# Patient Record
Sex: FEMALE | Race: WHITE | NOT HISPANIC OR LATINO | Employment: OTHER | URBAN - METROPOLITAN AREA
[De-identification: names, ages, dates, MRNs, and addresses within clinical notes are randomized per-mention and may not be internally consistent; named-entity substitution may affect disease eponyms.]

---

## 2017-05-25 ENCOUNTER — ALLSCRIPTS OFFICE VISIT (OUTPATIENT)
Dept: OTHER | Facility: OTHER | Age: 82
End: 2017-05-25

## 2017-09-12 DIAGNOSIS — F32.9 MAJOR DEPRESSIVE DISORDER, SINGLE EPISODE: ICD-10-CM

## 2017-09-12 DIAGNOSIS — G30.1 ALZHEIMER'S DISEASE WITH LATE ONSET (CODE) (HCC): ICD-10-CM

## 2017-09-14 ENCOUNTER — LAB CONVERSION - ENCOUNTER (OUTPATIENT)
Dept: OTHER | Facility: OTHER | Age: 82
End: 2017-09-14

## 2017-09-14 LAB — VALPROIC ACID TOTAL (HISTORICAL): 37.3 MG/L (ref 50–100)

## 2017-09-18 ENCOUNTER — GENERIC CONVERSION - ENCOUNTER (OUTPATIENT)
Dept: OTHER | Facility: OTHER | Age: 82
End: 2017-09-18

## 2017-11-08 ENCOUNTER — ALLSCRIPTS OFFICE VISIT (OUTPATIENT)
Dept: OTHER | Facility: OTHER | Age: 82
End: 2017-11-08

## 2017-11-08 ENCOUNTER — TRANSCRIBE ORDERS (OUTPATIENT)
Dept: RADIOLOGY | Facility: CLINIC | Age: 82
End: 2017-11-08

## 2017-11-08 DIAGNOSIS — R09.89 OTHER SPECIFIED SYMPTOMS AND SIGNS INVOLVING THE CIRCULATORY AND RESPIRATORY SYSTEMS: ICD-10-CM

## 2017-11-09 ENCOUNTER — HOSPITAL ENCOUNTER (OUTPATIENT)
Dept: ULTRASOUND IMAGING | Facility: CLINIC | Age: 82
Discharge: HOME | End: 2017-11-09
Payer: MEDICARE

## 2017-11-09 DIAGNOSIS — R09.89 OTHER SPECIFIED SYMPTOMS AND SIGNS INVOLVING THE CIRCULATORY AND RESPIRATORY SYSTEMS: ICD-10-CM

## 2017-11-09 PROCEDURE — 93880 EXTRACRANIAL BILAT STUDY: CPT

## 2017-11-10 NOTE — PROGRESS NOTES
Assessment  1  SDAT (senile dementia of Alzheimer's type) (331 0,294 10) (G30 1,F02 80)   2  Bruit of right carotid artery (785 9) (R09 89)   3  Depression (311) (F32 9)    Plan   Bruit of right carotid artery    · Continue with our present treatment plan ; Status:Complete;   Done: [id number]   Ordered;of right carotid artery; Ordered By:Bobby Hatch;  SDAT (senile dementia of Alzheimer's type)    · Divalproex Sodium 250 MG Oral Tablet Delayed Release (Depakote); TAKE 1TABLET TWICE DAILY   Rx By: Henna Villalobos; Dispense: 90 Days ; #:180 Tablet Delayed Release; Refill: 3;For: SDAT (senile dementia of Alzheimer's type); RANDY = N; Verified Transmission to [facility]; Last Updated By: System, SureScript[employer]; 11/8/2017 2:33:28 PM   · Donepezil HCl - 10 MG Oral Tablet (Aricept); take 1 tablet by mouth once daily   Rx By: Henna Villalobos; Dispense: 90 Days ; #:90 Tablet; Refill: 3;SDAT (senile dementia of Alzheimer's type); RANDY = N; Verified Transmission to [facility]; Last Updated By: System, SureScripts; 11/8/2017 2:33:28 PM  VAS CAROTID COMPLETE STUDY; SIDE:Bilateral; Status:Hold For - Scheduling; Requested [facility]:[id number];  Perform:[facility]; [facility]:[id number]; Ordered; For:Bruit of right carotid artery; Ordered By:Bobby Hatch; Follow-up visit in 3 months Evaluation and Treatment  Follow-up  Status: Hold For - Scheduling  Requested for: [id number] Ordered; For: Bruit of right carotid artery;  Ordered By: Henna Villalobos  Performed:   Due: 15LLV2917   Discussion/Summary  Discussion Summary:   Patient with a history of SDAT, with sundowning syndrome is advised to continue present medications  Mental stimulating exercises were again discussed and the patient will also undergo a carotid ultrasound in the near future, and remains on anticoagulation for atrial fibrillation  She will return back to see me in 3-4 months        Chief Complaint  Chief Complaint Free Text Note Form: Patient present for follow up appointment for SDAT and depression  History of Present Illness  HPI: Patient is here for a follow-up visit accompanied with the  and in the recent past was experiencing sundowning symptoms which have improved with the help of Seroquel, Depakote and was also prescribed an anxiolytic by her primary physician which is helpful  From the cognitive standpoint she has been relatively stable on Aricept 10 milligrams daily  From in the past she has had side effects with Exelon and Namenda  Patient denies any new neurological symptoms and ambulates with the help of a cane due to chronic right hip DJD  Review of Systems  Neurological ROS:  Constitutional: fatigue  HEENT:  no sinus problems, not feeling congested, no blurred vision, no dryness of the eyes, no eye pain, no hearing loss, no tinnitus, no mouth sores, no sore throat, no hoarseness, no dysphagia, no masses, no bleeding  Cardiovascular:  no chest pain or pressure, no palpitations present, the heart rate was not rapid or irregular, no swelling in the arms or legs, no poor circulation  Respiratory:  no unusual or persistant cough, no shortness of breath with or without exertion  Gastrointestinal: diarrhea  Genitourinary: feelings of urinary urgency  Musculoskeletal: arthralgias  Integumentary  no masses, no rash, no skin lesions, no livedo reticularis  Psychiatric: depression  Endocrine  no unusual weight loss or gain, no excessive urination, no excessive thirst, no hair loss or gain, no hot or cold intolerance, no menstrual period change or irregularity, no loss of sexual ability or drive, no erection difficulty, no nipple discharge  Hematologic/Lymphatic:  no unusual bleeding, no tendency for easy bruising, no clotting skin or lumps  Neurological General: lightheadedness  Neurological Mental Status: confusion-- and-- memory problems    Neurological Cranial Nerves:  no blurry or double vision, no loss of vision, no face drooping, no facial numbness or weakness, no taste or smell loss/changes, no hearing loss or ringing, no vertigo or dizziness, no dysphagia, no slurred speech  Neurological Motor findings include:  no tremor, no twitching, no cramping(pre/post exercise), no atrophy  Neurological Coordination:  no unsteadiness, no vertigo or dizziness, no clumsiness, no problems reaching for objects  Neurological Sensory:  no numbness, no pain, no tingling, does not fall when eyes closed or taking a shower  Neurological Gait:  no difficulty walking, not falling to one side, no sensation of being pushed, has not had falls  ROS Reviewed:   ROS reviewed  Active Problems  1  Depression (311) (F32 9)   2  Diabetes (250 00) (E11 9)   3  Essential hypertension (401 9) (I10)   4  GERD (gastroesophageal reflux disease) (530 81) (K21 9)   5  Hyperlipemia (272 4) (E78 5)   6  Mild asthma (493 90) (J45 998)   7  SDAT (senile dementia of Alzheimer's type) (331 0,294 10) (G30 1,F02 80)   8  Thyroid disorder (246 9) (E07 9)    Past Medical History  1  History of Anxiety (300 00) (F41 9)   2  History of hypertension (V12 59) (Z86 79)   3  History of malignant neoplasm of colon (V10 05) (Z85 038)   4  History of vertigo (V12 49) (Z87 898)   5  History of Memory impairment (780 93) (R41 3)    Surgical History  1  History of Cataract Surgery   2  History of Exchange Of Intraocular Lens   3  History of Mohs Micrographic Surgery Nose   4  History of Pacemaker Placement   5  History of Partial Colectomy   6  History of Permanent Pacemaker Implantation Date    Family History  Mother    1  No pertinent family history    Social History     · Former smoker (Y25 65) (Q35 251)   ·    · Retired  Social History Reviewed: The social history was reviewed and updated today  Current Meds   1   Albuterol Sulfate (2 5 MG/3ML) 0 083% Inhalation Nebulization Solution; USE 1 UNIT DOSE EVERY 4-6 HOURS AS NEEDED FOR WHEEZING ; Therapy: (Recorded:08Nov2017) to Recorded   2  AmLODIPine Besylate 5 MG Oral Tablet; TAKE 1 TABLET DAILY; Therapy: (Recorded:63Ycb0574) to Recorded   3  Atenolol 50 MG Oral Tablet; Take 1 tablet daily; Therapy: (Recorded:23Lov9584) to Recorded   4  Atorvastatin Calcium 10 MG Oral Tablet; 1 tablet at bedtime; Therapy: (Recorded:31Gye4545) to Recorded   5  Biotin 1000 MCG Oral Tablet; Take 1 tablet daily; Therapy: (Recorded:06May2016) to Recorded   6  Boniva 150 MG Oral Tablet; once a month on the first day; Therapy: (Recorded:04Sep2015) to Recorded   7  Breo Ellipta 100-25 MCG/INH Inhalation Aerosol Powder Breath Activated; USE 1 INHALATION ONCE DAILY; Therapy: (Recorded:06May2016) to Recorded   8  Centrum Silver Adult 50+ Oral Tablet; Therapy: ([id number]) to Recorded   9  Cinnamon CAPS; 1000mg 1 Daily; Therapy: (Recorded:06May2016) to Recorded   10  Divalproex Sodium 250 MG Oral Tablet Delayed Release; TAKE 1 TABLET TWICE DAILY; Therapy: 68Auj3872 to (Harjinder May)  Requested for: 16Rwb7976; Last  Rx:[id number] Ordered   11  Donepezil HCl - 10 MG Oral Tablet; take 1 tablet by mouth once daily; Therapy: 62YQQ3354 to (Ainsley Laguerre)  Requested for: 61ZGQ3856; Last  Rx:[id number] Ordered   12  Escitalopram Oxalate 10 MG Oral Tablet; Take 1 tablet daily; Therapy: (Recorded:25May2017) to Recorded   13  Estrace 0 5 MG Oral Tablet; TAKE 1 TABLET DAILY; Therapy: (Recorded:25May2017) to Recorded   14  Levothyroxine Sodium 75 MCG Oral Tablet; 1/2 tab daily; Therapy: (Recorded:70Yjc5406) to Recorded   15  NitroQuick 0 4 MG SUBL; as needed; Therapy: (Recorded:63Fqk0221) to Recorded   16  Pantoprazole Sodium 40 MG Oral Tablet Delayed Release; One Tablet twice daily; Therapy: (Recorded:27Yrs5849) to Recorded   17  Premarin CREA; INSERT APPLICATOR  PRN; Therapy: (Recorded:13Oct2015) to Recorded   18  Probiotic Product TABS; Therapy: (Recorded:13Jwf6535) to Recorded   19   QUEtiapine Fumarate 25 MG Oral Tablet; take 1-2 tabs at bedtime; Therapy: (Recorded:58Qny4480) to Recorded   20  Super B-Complex TABS; TAKE 1 TABLET DAILY; Therapy: (Recorded:93Wyd9810) to Recorded   21  Triamcinolone Acetonide(Nasal) 50 MCG/SPRAY SOLN; USE 2 SPRAYS IN EACH  NOSTRIL ONCE DAILY; Therapy: (Recorded:18Rlh8669) to Recorded   22  Ventolin HFA AERS; INHALE 2 PUFFS EVERY 4 HOURS AS NEEDED; Therapy: (Recorded:06Gja1992) to Recorded   23  Vitamin D3 2000 UNIT Oral Capsule; take 1 capsule daily; Therapy: (Recorded:88Kyp8625) to Recorded   24  Xarelto 20 MG Oral Tablet; TAKE 1 TABLET Bedtime; Therapy: (Recorded:68Kgz6920) to Recorded  Medication List Reviewed: The medication list was reviewed and updated today  Allergies  1  Ambien CR TBCR   2  Aricept TABS   3  Codeine Derivatives   4  Penicillins   5  Strattera CAPS    6  Other    Vitals  Signs   Recorded: 76VWH5555 01:47PM   Heart Rate: 60  Systolic: 922  Diastolic: 70  Height: 5 ft 2 5 in  Weight: 145 lb 8 oz  BMI Calculated: 26 19  BSA Calculated: 1 68    Physical Exam   Constitutional  General appearance: No acute distress, well appearing and well nourished  Musculoskeletal  Gait and station: Normal gait, stance and balance  Muscle strength: Normal strength throughout  Muscle tone: No atrophy, abnormal movements, flaccidity, cogwheeling or spasticity  Neurologic  Orientation to person, place, and time: Normal    Recent and remote memory: Abnormal  -- [place] cognitive assessment scale reveals a score of 15/30  Attention span and concentration: Normal thought process and attention span  Language: Names objects, able to repeat phrases and speaks spontaneously  Fund of knowledge: Abnormal    3rd, 4th, and 6th cranial nerves: Normal    7th cranial nerve: Normal    Sensation: Normal    Reflexes: Normal    Coordination: Normal   Patient has evidence of a right carotid bruit        Future Appointments    Date/Time Provider Specialty Site   02/28/2018 02:00 PM Molly Romero MD Neurology [facility]       Signatures   Electronically signed by :  Ofelia Fraire MD; Nov 8 2017  3:07PM EST                       (Author)

## 2017-12-12 ENCOUNTER — GENERIC CONVERSION - ENCOUNTER (OUTPATIENT)
Dept: NEUROLOGY | Facility: CLINIC | Age: 82
End: 2017-12-12

## 2018-01-12 VITALS
WEIGHT: 145.5 LBS | DIASTOLIC BLOOD PRESSURE: 70 MMHG | HEIGHT: 63 IN | HEART RATE: 60 BPM | BODY MASS INDEX: 25.78 KG/M2 | SYSTOLIC BLOOD PRESSURE: 122 MMHG

## 2018-01-14 VITALS
HEIGHT: 65 IN | DIASTOLIC BLOOD PRESSURE: 76 MMHG | WEIGHT: 137 LBS | BODY MASS INDEX: 22.82 KG/M2 | SYSTOLIC BLOOD PRESSURE: 136 MMHG

## 2018-01-18 NOTE — MISCELLANEOUS
To Whom It May Concern,    The above patient suffers from dementia of the Alzheimer's type with behavioral symptoms and requiring complete assistance as well as constant supervision  She is not in a position to make any decisions  for herself at will benefit from a assisted living facility with dementia care  If you have any further questions please do not hesitate to contact this office  sincerely,    MATT Hager       Electronically signed by: Edi Smith MD  Sep 18 2017 11:45AM EST      Electronically signed by: Edi Smith MD  Sep 18 2017 11:45AM EST

## 2018-02-16 DIAGNOSIS — G30.1 SDAT (SENILE DEMENTIA OF ALZHEIMER'S TYPE) (HCC): Primary | ICD-10-CM

## 2018-02-16 DIAGNOSIS — F02.80 SDAT (SENILE DEMENTIA OF ALZHEIMER'S TYPE) (HCC): Primary | ICD-10-CM

## 2018-02-16 RX ORDER — DIVALPROEX SODIUM 250 MG/1
TABLET, DELAYED RELEASE ORAL
Qty: 60 TABLET | Refills: 1 | Status: SHIPPED | OUTPATIENT
Start: 2018-02-16 | End: 2018-05-11 | Stop reason: SDUPTHER

## 2018-02-28 ENCOUNTER — OFFICE VISIT (OUTPATIENT)
Dept: NEUROLOGY | Facility: CLINIC | Age: 83
End: 2018-02-28
Payer: MEDICARE

## 2018-02-28 VITALS
SYSTOLIC BLOOD PRESSURE: 124 MMHG | BODY MASS INDEX: 27.36 KG/M2 | HEART RATE: 58 BPM | DIASTOLIC BLOOD PRESSURE: 70 MMHG | WEIGHT: 152 LBS

## 2018-02-28 DIAGNOSIS — G30.1 SDAT (SENILE DEMENTIA OF ALZHEIMER'S TYPE) (HCC): Primary | ICD-10-CM

## 2018-02-28 DIAGNOSIS — F02.80 SDAT (SENILE DEMENTIA OF ALZHEIMER'S TYPE) (HCC): Primary | ICD-10-CM

## 2018-02-28 PROBLEM — F32.A DEPRESSION: Status: ACTIVE | Noted: 2018-02-28

## 2018-02-28 PROCEDURE — 99214 OFFICE O/P EST MOD 30 MIN: CPT | Performed by: PSYCHIATRY & NEUROLOGY

## 2018-02-28 RX ORDER — AMPICILLIN TRIHYDRATE 250 MG
1000 CAPSULE ORAL 2 TIMES DAILY
COMMUNITY

## 2018-02-28 RX ORDER — NITROGLYCERIN 0.4 MG/1
TABLET SUBLINGUAL
COMMUNITY

## 2018-02-28 RX ORDER — LEVOTHYROXINE SODIUM 0.07 MG/1
0.5 TABLET ORAL
COMMUNITY
Start: 2017-03-07

## 2018-02-28 RX ORDER — MULTIVIT-MIN/FA/LYCOPEN/LUTEIN .4-300-25
TABLET ORAL
COMMUNITY
End: 2018-08-29

## 2018-02-28 RX ORDER — ACETAMINOPHEN 160 MG
1 TABLET,DISINTEGRATING ORAL DAILY
COMMUNITY

## 2018-02-28 RX ORDER — BIOTIN 1 MG
1 TABLET ORAL DAILY
COMMUNITY

## 2018-02-28 RX ORDER — IBANDRONATE SODIUM 150 MG/1
150 TABLET, FILM COATED ORAL
COMMUNITY

## 2018-02-28 RX ORDER — FLUTICASONE FUROATE AND VILANTEROL 100; 25 UG/1; UG/1
1 POWDER RESPIRATORY (INHALATION) DAILY
COMMUNITY

## 2018-02-28 RX ORDER — ATORVASTATIN CALCIUM 10 MG/1
1 TABLET, FILM COATED ORAL
COMMUNITY

## 2018-02-28 RX ORDER — QUETIAPINE FUMARATE 50 MG/1
50 TABLET, FILM COATED ORAL
COMMUNITY

## 2018-02-28 RX ORDER — ATENOLOL 50 MG/1
1.5 TABLET ORAL DAILY
COMMUNITY

## 2018-02-28 RX ORDER — AMLODIPINE BESYLATE 5 MG/1
1 TABLET ORAL DAILY
COMMUNITY

## 2018-02-28 RX ORDER — PANTOPRAZOLE SODIUM 40 MG/1
TABLET, DELAYED RELEASE ORAL
COMMUNITY
Start: 2018-02-12

## 2018-02-28 RX ORDER — ALBUTEROL SULFATE 2.5 MG/3ML
1 SOLUTION RESPIRATORY (INHALATION)
COMMUNITY

## 2018-02-28 RX ORDER — ALBUTEROL SULFATE 90 UG/1
2 AEROSOL, METERED RESPIRATORY (INHALATION) EVERY 4 HOURS PRN
COMMUNITY
End: 2018-08-29

## 2018-02-28 NOTE — PROGRESS NOTES
Patient ID: Mohsen Garcia is a 80 y o  female  Assessment:  No diagnosis found  Plan:        Subjective:    HPI    HISTORY:    Past Medical History:   Diagnosis Date    Depression     Diabetes ([address] )     Esophageal reflux     Essential hypertension     GERD (gastroesophageal reflux disease)     Hyperlipidemia     Mild asthma     SDAT (senile dementia of Alzheimer's type)     Thyroid disorder        Past Surgical History:   Procedure Laterality Date    BLADDER IRRIGATION      CARDIAC PACEMAKER PLACEMENT      CATARACT EXTRACTION      COLECTOMY      INTRAOCULAR LENS EXCHANGE      MOHS RECONSTRUCTION         History reviewed  No pertinent family history  reports that she has quit smoking  She has never used smokeless tobacco  She reports that she does not drink alcohol or use drugs  ALLERGIES:  Alcohol; Atomoxetine;  Codeine; Penicillin g; Sugar-protein-starch; and Zolpidem      Current Outpatient Prescriptions:     albuterol (2 5 mg/3 mL) 0 083 % nebulizer solution, Inhale 1 each, Disp: , Rfl:     albuterol (VENTOLIN HFA) 90 mcg/act inhaler, Inhale 2 puffs every 4 (four) hours as needed, Disp: , Rfl:     amLODIPine (NORVASC) 5 mg tablet, Take 1 tablet by mouth daily, Disp: , Rfl:     atenolol (TENORMIN) 50 mg tablet, Take 1 tablet by mouth daily, Disp: , Rfl:     atorvastatin (LIPITOR) 10 mg tablet, Take 1 tablet by mouth, Disp: , Rfl:     B Complex Vitamins (VITAMIN B COMPLEX PO), Take 1 capsule by mouth, Disp: , Rfl:     Biotin 1000 MCG tablet, Take 1 tablet by mouth daily, Disp: , Rfl:     Cholecalciferol (VITAMIN D3) 2000 units capsule, Take 1 capsule by mouth daily, Disp: , Rfl:     Cinnamon 500 MG capsule, Take by mouth, Disp: , Rfl:     conjugated estrogens (PREMARIN) vaginal cream, Insert into the vagina, Disp: , Rfl:     divalproex sodium (DEPAKOTE) 250 mg EC tablet, take 1 tablet by mouth twice a day, Disp: 60 tablet, Rfl: 1    fluticasone furoate-vilanterol (BREO ELLIPTA), Inhale daily, Disp: , Rfl:     ibandronate (BONIVA) 150 MG tablet, Take by mouth, Disp: , Rfl:     levothyroxine 75 mcg tablet, Take 0 5 tablets by mouth, Disp: , Rfl:     Multiple Vitamins-Minerals (CENTRUM SILVER ADULT 50+) TABS, Take by mouth, Disp: , Rfl:     nitroglycerin (NITROSTAT) 0 4 mg SL tablet, Place under the tongue, Disp: , Rfl:     pantoprazole (PROTONIX) 40 mg tablet, TAKE 1 TABLET ONCE DAILY, Disp: , Rfl:     Probiotic Product (VISBIOME PROBIOTIC HIGH POT) CAPS, Take by mouth, Disp: , Rfl:     QUEtiapine (SEROquel) 50 mg tablet, Take 50 mg by mouth, Disp: , Rfl:     rivaroxaban (XARELTO) 20 mg tablet, Take 1 tablet by mouth, Disp: , Rfl:     TRIAMCINOLONE ACETONIDE,NASAL, NA, 2 sprays into each nostril daily, Disp: , Rfl:      Objective:    Blood pressure 124/70, pulse 58, weight 68 9 kg (152 lb)  Physical Exam    Neurological Exam      ROS:    Review of Systems   Constitutional: Negative  HENT: Negative  Eyes: Negative  Respiratory: Negative  Cardiovascular: Negative  Gastrointestinal: Negative  Endocrine: Negative  Genitourinary: Positive for frequency  Musculoskeletal: Negative  Skin: Negative  Allergic/Immunologic: Positive for environmental allergies  Neurological: Negative  Hematological: Bruises/bleeds easily  Psychiatric/Behavioral: Positive for confusion and hallucinations

## 2018-04-10 ENCOUNTER — TELEPHONE (OUTPATIENT)
Dept: NEUROLOGY | Facility: CLINIC | Age: 83
End: 2018-04-10

## 2018-04-10 NOTE — TELEPHONE ENCOUNTER
Pt's son requesting call to discuss mother  He states that his father who is pt's caregiver is depressed (recently started on medication) and they are thinking of putting pt in a home as it has become too difficult to care for pt        Heide Roberto [phone]

## 2018-05-11 DIAGNOSIS — F02.80 SDAT (SENILE DEMENTIA OF ALZHEIMER'S TYPE) (HCC): ICD-10-CM

## 2018-05-11 DIAGNOSIS — G30.1 SDAT (SENILE DEMENTIA OF ALZHEIMER'S TYPE) (HCC): ICD-10-CM

## 2018-05-11 RX ORDER — DIVALPROEX SODIUM 250 MG/1
250 TABLET, DELAYED RELEASE ORAL 2 TIMES DAILY
Qty: 60 TABLET | Refills: 5 | Status: SHIPPED | OUTPATIENT
Start: 2018-05-11 | End: 2018-05-21 | Stop reason: SDUPTHER

## 2018-05-11 NOTE — TELEPHONE ENCOUNTER
Received call from [facility] [place] asking for a script for Depakote to be faxed to them  Pt is new to their facility and they needs this on file  I orderd this to print, please then have MA fax       Fax: [phone]

## 2018-05-21 RX ORDER — DIVALPROEX SODIUM 250 MG/1
250 TABLET, DELAYED RELEASE ORAL 2 TIMES DAILY
Qty: 60 TABLET | Refills: 0 | Status: SHIPPED | OUTPATIENT
Start: 2018-05-21

## 2018-05-21 NOTE — TELEPHONE ENCOUNTER
We received another fax  It cannot be sent electronically  They need the prescription faxed to them  Please authorize to print and Jairo Suazo please fax to the number listed in this encounter

## 2018-05-22 NOTE — TELEPHONE ENCOUNTER
He[facility] patrick called again  [facility]  I emailed [facility] to you    Please print and have dr Low Gaston sign and please fax to facility

## 2018-08-29 ENCOUNTER — OFFICE VISIT (OUTPATIENT)
Dept: NEUROLOGY | Facility: CLINIC | Age: 83
End: 2018-08-29
Payer: MEDICARE

## 2018-08-29 VITALS — HEART RATE: 60 BPM | SYSTOLIC BLOOD PRESSURE: 126 MMHG | DIASTOLIC BLOOD PRESSURE: 78 MMHG

## 2018-08-29 DIAGNOSIS — F02.80 SDAT (SENILE DEMENTIA OF ALZHEIMER'S TYPE) (HCC): Primary | ICD-10-CM

## 2018-08-29 DIAGNOSIS — G30.1 SDAT (SENILE DEMENTIA OF ALZHEIMER'S TYPE) (HCC): Primary | ICD-10-CM

## 2018-08-29 PROCEDURE — 99214 OFFICE O/P EST MOD 30 MIN: CPT | Performed by: PSYCHIATRY & NEUROLOGY

## 2018-08-29 RX ORDER — FEXOFENADINE HCL 180 MG/1
180 TABLET ORAL AS NEEDED
COMMUNITY

## 2018-08-29 RX ORDER — QUETIAPINE FUMARATE 50 MG/1
50 TABLET, FILM COATED ORAL 2 TIMES DAILY
COMMUNITY

## 2018-08-29 RX ORDER — LORAZEPAM 0.5 MG/1
0.5 TABLET ORAL EVERY 6 HOURS PRN
COMMUNITY

## 2018-08-29 RX ORDER — DONEPEZIL HYDROCHLORIDE 10 MG/1
10 TABLET, FILM COATED ORAL
COMMUNITY
End: 2018-08-29 | Stop reason: SDUPTHER

## 2018-08-29 RX ORDER — DIVALPROEX SODIUM 250 MG/1
250 TABLET, DELAYED RELEASE ORAL DAILY
COMMUNITY

## 2018-08-29 RX ORDER — ESCITALOPRAM OXALATE 20 MG/1
20 TABLET ORAL DAILY
COMMUNITY

## 2018-08-29 RX ORDER — DONEPEZIL HYDROCHLORIDE 10 MG/1
10 TABLET, FILM COATED ORAL
Qty: 30 TABLET | Refills: 6 | Status: SHIPPED | OUTPATIENT
Start: 2018-08-29 | End: 2018-09-05 | Stop reason: SDUPTHER

## 2018-08-29 RX ORDER — ALBUTEROL SULFATE 90 UG/1
2 AEROSOL, METERED RESPIRATORY (INHALATION) EVERY 6 HOURS PRN
COMMUNITY

## 2018-08-29 RX ORDER — ASPIRIN 325 MG
325 TABLET ORAL EVERY 6 HOURS PRN
COMMUNITY

## 2018-08-29 NOTE — PROGRESS NOTES
Progress Note - Neurology   Sherry Michaud 80 y o  female MRN: [id number]  Unit/Bed#:  Encounter: [id number]      Subjective:   Patient is here for a follow-up visit accompanied with the  on a wheelchair, and since her last visit patient has been admitted to a dementia unit due to worsening behavioral symptoms and for the last 3-4 weeks has been having worsening gait difficulty  She has been started on rehab with some degree of improvement but continues to have difficulty standing from the sitting position  Patient denies any back pain or knee pain and denies any pain in her proximal thigh muscles  At baseline she has severe dementia and her medications were all reviewed  Patient also has been on Depakote as well as Seroquel 3 times a day for behavioral symptoms and as per the  has been more docile than usual   She remains on Aricept 10 mg daily  ROS:   Review of Systems   HENT: Negative  Eyes: Negative  Respiratory: Negative  Cardiovascular: Positive for leg swelling  Gastrointestinal: Negative  Endocrine: Negative  Genitourinary: Positive for frequency  Musculoskeletal: Negative  Skin: Negative  Allergic/Immunologic: Negative  Neurological: Positive for weakness  Hematological: Bruises/bleeds easily  Psychiatric/Behavioral: Positive for confusion  Vitals:   Vitals:    08/29/18 1125   BP: 126/78   Pulse: 60   ,There is no height or weight on file to calculate BMI      MEDS:      Current Outpatient Prescriptions:     albuterol (2 5 mg/3 mL) 0 083 % nebulizer solution, Inhale 1 each, Disp: , Rfl:     albuterol (VENTOLIN HFA) 90 mcg/act inhaler, Inhale 2 puffs every 6 (six) hours as needed for wheezing, Disp: , Rfl:     amLODIPine (NORVASC) 5 mg tablet, Take 1 tablet by mouth daily, Disp: , Rfl:     aspirin 325 mg tablet, Take 325 mg by mouth every 6 (six) hours as needed for mild pain, Disp: , Rfl:     atenolol (TENORMIN) 50 mg tablet, Take 1 5 tablets by mouth daily  , Disp: , Rfl:     atorvastatin (LIPITOR) 10 mg tablet, Take 1 tablet by mouth, Disp: , Rfl:     B Complex Vitamins (VITAMIN B COMPLEX PO), Take 1 capsule by mouth, Disp: , Rfl:     Biotin 1000 MCG tablet, Take 1 tablet by mouth daily, Disp: , Rfl:     Cholecalciferol (VITAMIN D3) 2000 units capsule, Take 1 capsule by mouth daily, Disp: , Rfl:     Cinnamon 500 MG capsule, Take 1,000 mg by mouth 2 (two) times a day  , Disp: , Rfl:     divalproex sodium (DEPAKOTE) 250 mg EC tablet, Take 1 tablet (250 mg total) by mouth 2 (two) times a day, Disp: 60 tablet, Rfl: 0    divalproex sodium (DEPAKOTE) 250 mg EC tablet, Take 250 mg by mouth daily, Disp: , Rfl:     donepezil (ARICEPT) 10 mg tablet, Take 10 mg by mouth daily at bedtime, Disp: , Rfl:     escitalopram (LEXAPRO) 20 mg tablet, Take 20 mg by mouth daily, Disp: , Rfl:     fexofenadine (ALLEGRA) 180 MG tablet, Take 180 mg by mouth as needed, Disp: , Rfl:     fluticasone furoate-vilanterol (BREO ELLIPTA), Inhale 1 puff daily  , Disp: , Rfl:     ibandronate (BONIVA) 150 MG tablet, Take 150 mg by mouth every 30 (thirty) days  , Disp: , Rfl:     levothyroxine 75 mcg tablet, Take 0 5 tablets by mouth, Disp: , Rfl:     LORazepam (ATIVAN) 0 5 mg tablet, Take 0 5 mg by mouth every 6 (six) hours as needed for anxiety, Disp: , Rfl:     nitroglycerin (NITROSTAT) 0 4 mg SL tablet, Place under the tongue, Disp: , Rfl:     pantoprazole (PROTONIX) 40 mg tablet, TAKE 1 TABLET TWICE DAILY, Disp: , Rfl:     QUEtiapine (SEROquel) 50 mg tablet, Take 50 mg by mouth, Disp: , Rfl:     QUEtiapine (SEROquel) 50 mg tablet, Take 50 mg by mouth 2 (two) times a day, Disp: , Rfl:     rivaroxaban (XARELTO) 20 mg tablet, Take 1 tablet by mouth, Disp: , Rfl:     TRIAMCINOLONE ACETONIDE,NASAL, NA, 2 sprays into each nostril daily, Disp: , Rfl:   :    Physical Exam:  General appearance: alert, appears stated age and cooperative  Head: Normocephalic, without obvious abnormality, atraumatic    Neurologic:  On neurological examination [place] cognitive assessment scale could not be completed since the patient was not cooperative, and there is no evidence of any cranial nerve deficit, she has mild proximal weakness in both lower extremities with no evidence of any tenderness, deep tendon reflexes are hypoactive, and no sensory loss was noted  She has no evidence of any dysmetria  She could stand with assistance and ambulates with minimal assistance with a stooped posture  No other extrapyramidal signs were noted  Lab Results: I have personally reviewed pertinent reports  Imaging Studies: I have personally reviewed pertinent reports  Assessment:  1  SDAT with behavioral dysfunction  2  Worsening gait dysfunction of 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

## 2018-09-05 DIAGNOSIS — F02.80 SDAT (SENILE DEMENTIA OF ALZHEIMER'S TYPE) (HCC): ICD-10-CM

## 2018-09-05 DIAGNOSIS — G30.1 SDAT (SENILE DEMENTIA OF ALZHEIMER'S TYPE) (HCC): ICD-10-CM

## 2018-09-05 RX ORDER — DONEPEZIL HYDROCHLORIDE 10 MG/1
10 TABLET, FILM COATED ORAL
Qty: 30 TABLET | Refills: 6 | Status: SHIPPED | OUTPATIENT
Start: 2018-09-05

## 2018-09-05 NOTE — TELEPHONE ENCOUNTER
Mia Oropeza from [address] [place] [address] home called stating they need donepezil rx sent to [facility]  Pt no longer uses [facility]

## 2020-01-17 ENCOUNTER — TELEPHONE (OUTPATIENT)
Dept: NEUROLOGY | Facility: CLINIC | Age: 85
End: 2020-01-17